# Patient Record
Sex: MALE | Race: BLACK OR AFRICAN AMERICAN | NOT HISPANIC OR LATINO | URBAN - METROPOLITAN AREA
[De-identification: names, ages, dates, MRNs, and addresses within clinical notes are randomized per-mention and may not be internally consistent; named-entity substitution may affect disease eponyms.]

---

## 2020-03-27 ENCOUNTER — EMERGENCY (EMERGENCY)
Facility: HOSPITAL | Age: 50
LOS: 0 days | Discharge: HOME | End: 2020-03-27
Payer: COMMERCIAL

## 2020-03-27 VITALS
SYSTOLIC BLOOD PRESSURE: 119 MMHG | RESPIRATION RATE: 18 BRPM | DIASTOLIC BLOOD PRESSURE: 92 MMHG | HEIGHT: 67 IN | TEMPERATURE: 99 F | OXYGEN SATURATION: 99 % | HEART RATE: 99 BPM | WEIGHT: 154.98 LBS

## 2020-03-27 DIAGNOSIS — R50.9 FEVER, UNSPECIFIED: ICD-10-CM

## 2020-03-27 DIAGNOSIS — B34.9 VIRAL INFECTION, UNSPECIFIED: ICD-10-CM

## 2020-03-27 PROCEDURE — 99283 EMERGENCY DEPT VISIT LOW MDM: CPT

## 2020-03-27 RX ORDER — ACETAMINOPHEN WITH CODEINE 300MG-30MG
15 TABLET ORAL
Qty: 300 | Refills: 0
Start: 2020-03-27 | End: 2020-03-31

## 2020-03-27 NOTE — ED PROVIDER NOTE - PHYSICAL EXAMINATION
PE:   GEN: Awake, alert, interactive, NAD, non-toxic appearing.   NOSE: Patent, no epistaxis, no congestion  MOUTH/THROAT: Patent, uvula midline, no tonsillar edema or erythema, no acute dental findings, no oral lesions or ulcerations, no Hoarse voice, MMM  CARDIAC: FAST rate and rhythm, S1,S2, no murmur/rub/gallop. Strong central and peripheral pulses, Brisk cap refill, no evident pedal edema.   RESP: No distress noted. + cough L/S clear = Bilat without accessory muscle use, wheeze, rhonchi, rales.   NEURO: AOx3, CN II-XII grossly intact without focal deficit.   SKIN: Warm, dry, normal color, without apparent rashes.

## 2020-03-27 NOTE — ED PROVIDER NOTE - CLINICAL SUMMARY MEDICAL DECISION MAKING FREE TEXT BOX
48 yo male with cough and fever, neg flu swab at urgent care concerned for cough and difficulty sleeping will dc with cough medication and insturctions for covid-19 testing drive through.

## 2020-03-27 NOTE — ED PROVIDER NOTE - NS ED ROS FT
Constitutional: + Fever, - Chills, - Anorexia, - Fatigue  NOSE: - Congestion, - Bloody nose, - alteration in smell  MOUTH/THROAT: - Vocal Changes, - Drooling, - Sore throat,  -alteration in taste  CV: - Palpitations, - Chest Pain, - Edema   RESP:  + Cough, - Shortness of Breath, - Dyspnea on Exertion, - Trouble speaking, - Pain with breathing, - Wheezing  GI: + Diarrhea, - Constipation, - Nausea, - Vomiting, - Abdominal Pain  : - Dysuria, -Frequency, - Hematuria, - dark urine  SKIN: - Color change, - Rash, - Swelling, - Bruises, - Wounds  NEURO: - Change in behavior, - Dec. Alertness, - Headache, - Dizziness

## 2020-03-27 NOTE — ED PROVIDER NOTE - NS_EDPROVIDERDISPOUSERTYPE_ED_A_ED
spontaneous
belly breathing/spontaneous/labored
I have personally evaluated and examined the patient. The Attending was available to me as a supervising provider if needed.

## 2020-03-27 NOTE — ED ADULT NURSE NOTE - NSIMPLEMENTINTERV_GEN_ALL_ED
Implemented All Universal Safety Interventions:  [place] to call system. Call bell, personal items and telephone within reach. Instruct patient to call for assistance. Room bathroom lighting operational. Non-slip footwear when patient is off stretcher. Physically safe environment: no spills, clutter or unnecessary equipment. Stretcher in lowest position, wheels locked, appropriate side rails in place.

## 2020-03-27 NOTE — ED PROVIDER NOTE - PATIENT PORTAL LINK FT
You can access the FollowMyHealth Patient Portal offered by [facility] by registering at the following website: [email or web address]. By joining [employer]’s FollowMyHealth portal, you will also be able to view your health information using other applications (apps) compatible with our system.

## 2020-03-27 NOTE — ED PROVIDER NOTE - NSFOLLOWUPINSTRUCTIONS_ED_ALL_ED_FT
rest, hydrate well with water, Gatorade, protein shakes, smoothies, etc.    For fevers , chills, and/or aches/pains, you can take Tylenol 650mg-975mg (no more than 15 mg / kg) every 4-6 hours as needed.  Do not take more than 1000mg of Tylenol at a time, and do not take more than 4000mg of Tylenol daily.     cough medication as prescribed    return for worsening shortness of breath/difficulty breathing, severe weakness, or other concerns.     COVID-19    COVID-19, also known as coronavirus disease or novel coronavirus, is caused by a type of virus that causes respiratory illness. This may lead to inflammation and the buildup of mucus and fluids in the airway of the lungs (pneumonia). There are many different coronaviruses. Most of these viruses only affect animals, but sometimes these viruses can change and infect people.    What are the causes?  This illness is caused by a virus. You may catch the virus by:  Breathing in droplets from an infected person's cough or sneeze.Touching something, like a table or a doorknob, that was exposed to the virus (contaminated) and then touching your mouth, nose, or eyes.Being around animals that carry the virus, or eating uncooked or undercooked meat or animal products that contain the virus.    What increases the risk?  You are more likely to develop this condition if you:  Live in or travel to an area with a COVID-19 outbreak.Come in contact with a sick person who recently traveled to an area with a COVID-19 outbreak.Provide care for or live with a person who is infected with   COVID-19.What are the signs or symptoms?    COVID-19 causes respiratory illness that can lead to pneumonia. Symptoms of pneumonia may include:  A fever.A cough.Difficulty breathing.How is this diagnosed?  This condition may be diagnosed based on:    Your signs and symptoms, especially if:  You live in an area with a COVID-19 outbreak.You recently traveled to or from an area where the virus is common.You provide care for or live with a person who was diagnosed with COVID-19.A physical exam.    Lab tests, which may include:    A nasal swab to take a sample of fluid from your nose.A throat swab to take a sample of fluid from your throat.A sample of mucus from your lungs (sputum).Blood tests.How is this treated?  There is no medicine to treat COVID-19. Your health care provider will talk with you about ways to treat your symptoms. This may include rest, fluids, and over-the-counter medicines.  Follow these instructions at home:  Lifestyle     Use a cool-mist humidifier to add moisture to the air. This can help you breathe more easily.Do not use any products that contain nicotine or tobacco, such as cigarettes, e-cigarettes, and chewing tobacco. If you need help quitting, ask your health care provider.Rest at home as told by your health care provider.Return to your normal activities as told by your health care provider. Ask your health care provider what activities are safe for you.General instructions     Take over-the-counter and prescription medicines only as told by your health care provider.Drink enough fluid to keep your urine pale yellow.Keep all follow-up visits as told by your health care provider. This is important.How is this prevented?     There is no vaccine to help prevent COVID-19 infection. However, there are steps you can take to protect yourself and others from this virus.  To protect yourself:     Do not travel to areas where COVID-19 is a risk. The areas where COVID-19 is reported change often. To identify high-risk areas, check the CDC travel website: wwwnc.cdc.gov/travel/noticesIf you live in, or must travel to, an area where COVID-19 is a risk, take precautions to avoid infection.  Stay away from people who are sick.Stay away from places where there are animals that may carry the virus. This includes places where animals and animal products are sold. Note that both living and dead animals can carry the virus.Wash your hands often with soap and water. If soap and water are not available, use an alcohol-based hand .Avoid touching your mouth, face, eyes, or nose.To protect others:     If you have symptoms, take steps to prevent the virus from spreading to others.  If you think you have a COVID-19 infection, contact your health care provider right away. Tell your health care team that you think you may have a COVID-19 infection.Stay home. Leave your house only to seek medical care.Do not travel while you are sick.Wash your hands often with soap and water. If soap and water are not available, use alcohol-based hand .Stay away from other members of your household. If possible, stay in your own room, separate from others. Use a different bathroom.Make sure that all people in your household wash their hands well and often.Cough or sneeze into a tissue or your sleeve or elbow. Do not cough or sneeze into your hand or into the air.Wear a face mask.Where to find more information  Centers for Disease Control and Prevention: www.cdc.gov/coronavirus/2019-ncov/index.htmlW[facility] Organization: www.who.int/health-topics/coronavirusContact a health care provider if:  You have traveled to an area where COVID-19 is a risk and you have symptoms of the infection.You have contact with someone who has traveled to an area where COVID-19 is a risk and you have symptoms of the infection.Get help right away if:  You have trouble breathing.You have chest pain.    Summary  COVID-19 is caused by a type of virus that causes respiratory illness. This may lead to inflammation and the buildup of mucus and fluids in the airway of the lungs (pneumonia).You are more likely to develop this condition if you live in or travel to an area with a COVID-19 outbreak.There is no medicine to treat COVID-19. Your health care provider will talk with you about ways to treat your symptoms.Take steps to protect yourself and others from infection. Wash your hands often. Stay away from other people who are sick and wear a mask if you are sick.This information is not intended to replace advice given to you by your health care provider. Make sure you discuss any questions you have with your health care provider.

## 2020-03-27 NOTE — ED PROVIDER NOTE - OBJECTIVE STATEMENT
this is a 48 yo male, with 2 days of symptoms that include fever to 100.8 and cough that has been keeping him up at night.  has been taking tylenol for fever with relief. went to [facility] yesterday and reports neg flu test. no other complaints at this time. concerned for covid-19 as he is a .

## 2022-08-03 NOTE — ED ADULT NURSE NOTE - DRUG PRE-SCREENING (DAST -1)
[5___] : quadriceps 5[unfilled]/5 [AP] : anteroposterior [Lateral] : lateral [[place]] : skyline [Advanced patellofemoral OA] : Advanced patellofemoral OA [Left] : left foot and ankle [] : no lateral malleolus tenderness [FreeTextEntry3] : healed incision, prominent hardware laterally that is not TTP Statement Selected

## 2022-11-24 ENCOUNTER — EMERGENCY (EMERGENCY)
Facility: HOSPITAL | Age: 52
LOS: 1 days | Discharge: HOME | End: 2022-11-24
Attending: EMERGENCY MEDICINE | Admitting: EMERGENCY MEDICINE

## 2022-11-24 VITALS
RESPIRATION RATE: 16 BRPM | SYSTOLIC BLOOD PRESSURE: 147 MMHG | DIASTOLIC BLOOD PRESSURE: 99 MMHG | OXYGEN SATURATION: 100 % | HEART RATE: 84 BPM | TEMPERATURE: 98 F

## 2022-11-24 VITALS
RESPIRATION RATE: 16 BRPM | HEART RATE: 79 BPM | SYSTOLIC BLOOD PRESSURE: 142 MMHG | TEMPERATURE: 98 F | OXYGEN SATURATION: 100 % | DIASTOLIC BLOOD PRESSURE: 94 MMHG

## 2022-11-24 PROBLEM — Z78.9 OTHER SPECIFIED HEALTH STATUS: Chronic | Status: ACTIVE | Noted: 2020-03-27

## 2022-11-24 PROCEDURE — 99284 EMERGENCY DEPT VISIT MOD MDM: CPT

## 2022-11-24 PROCEDURE — 73030 X-RAY EXAM OF SHOULDER: CPT | Mod: 26,LT

## 2022-11-24 RX ORDER — ACETAMINOPHEN 500 MG
975 TABLET ORAL ONCE
Refills: 0 | Status: COMPLETED | OUTPATIENT
Start: 2022-11-24 | End: 2022-11-24

## 2022-11-24 RX ORDER — KETOROLAC TROMETHAMINE 30 MG/ML
15 SYRINGE (ML) INJECTION ONCE
Refills: 0 | Status: DISCONTINUED | OUTPATIENT
Start: 2022-11-24 | End: 2022-11-24

## 2022-11-24 RX ORDER — LIDOCAINE 4 G/100G
1 CREAM TOPICAL ONCE
Refills: 0 | Status: COMPLETED | OUTPATIENT
Start: 2022-11-24 | End: 2022-11-24

## 2022-11-24 RX ADMIN — Medication 15 MILLIGRAM(S): at 05:17

## 2022-11-24 RX ADMIN — Medication 975 MILLIGRAM(S): at 05:16

## 2022-11-24 RX ADMIN — LIDOCAINE 1 PATCH: 4 CREAM TOPICAL at 05:19

## 2022-11-24 NOTE — ED PROVIDER NOTE - NSFOLLOWUPINSTRUCTIONS_ED_ALL_ED_FT
You were seen in the emergency department for shoulder pain. We have evaluated you and determined that you do not require further hospital interventions.    Please follow up with your primary care provider as necessary to discuss the results of your stay in our department.    You may continue to experience pain while at home. For pain, you can take 1000 mg of Tylenol (acetaminophen) every 6 hours. Do not take more than four doses (4000 mg) of Tylenol in a 24h period. You can also take 400 mg of Motrin/Advil (ibuprofen) every 6 hours. Do not take more than four doses (1600 mg) of Motrin/Advil in a 24h period. In addition to medication, you may try over-the-counter lidocaine patches and heat as needed.    If you start to experience worsening symptoms such as numbness in your fingers, redness, or swelling, please return to the emergency department for further evaluation.

## 2022-11-24 NOTE — ED PROVIDER NOTE - PATIENT PORTAL LINK FT
You can access the FollowMyHealth Patient Portal offered by [facility] by registering at the following website: [email or web address]. By joining [employer]’s FollowMyHealth portal, you will also be able to view your health information using other applications (apps) compatible with our system.

## 2022-11-24 NOTE — ED PROVIDER NOTE - NSFOLLOWUPCLINICS_GEN_ALL_ED_FT
[facility]  Orthopedic Surgery  [address], 3rd & [address]th [place] [address]  Phone: [phone]  Fax:     [facility]  Sports Medicine  [address] [place] [address]  Phone: [phone]  Fax:

## 2022-11-24 NOTE — ED PROVIDER NOTE - CLINICAL SUMMARY MEDICAL DECISION MAKING FREE TEXT BOX
52M here for 2wks atraumatic shoulder pain. Exam consistent with MSK pain suggestive of rotator cuff pathology. He has no risk factors or history which suggests this is related to a cardiac cause. Will obtain XR of shoulder and send referral for ortho/sports med. Will give toradol, acetaminophen, lido patch, reassess.

## 2022-11-24 NOTE — ED PROVIDER NOTE - PHYSICAL EXAMINATION
General: well appearing, alert, oriented to person, time, place  Psych: mood appropriate  Head: no nuchal rigidity; normocephalic; atraumatic  Eyes: conjunctivae clear bilaterally, sclerae anicteric  ENT: no nasal flaring, patent nares  Cardio: RRR, no m/r/g, pulses 2+ b/l  Resp: CATB, no w/r/r  GI: soft/nondistended/nontender  : no CVA tenderness  Neuro: normal sensation, moving all four extremities equally  Skin: No evidence of rash or bruising  MSK: +Traore, +Job, no TTP, FROM in the L shoulder in abduction, external/internal rotation, flexion, and extension  Lymph/Vasc: no LE edema

## 2022-11-24 NOTE — ED ADULT TRIAGE NOTE - WAS YOUR LAST COVID-19 VACCINE GREATER THAN OR EQUAL TO TWO MONTHS AGO?
Susi from [facility] calling to go over the order for furosemide for patient . Please call her at, [phone]. Needs to verbally go over it.   Yes

## 2022-11-24 NOTE — ED ADULT NURSE NOTE - OBJECTIVE STATEMENT
Pt arrives to ED rm 11 A&Ox4 and ambulatory c/o L shoulder pain x2 weeks, that has remained consistent but became worse tonight. Pt denies any heavy lifting, or trauma to site. When pt lifts arm the pain worsens. Respirations are even and unlabored, pt denies chest pain, SOB, abd pain, nausea or vomiting. Medicated as per MD order

## 2022-11-24 NOTE — ED PROVIDER NOTE - OBJECTIVE STATEMENT
52M no PMH here for 2wks of atraumatic shoulder pain. Pain is nonexertional and radiates to the lateral neck, chest, and back. He denies any fevers, chills, or shortness of breath. 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 He has not tried anything for the pain apart from a topical spray.

## 2022-11-24 NOTE — ED ADULT TRIAGE NOTE - CHIEF COMPLAINT QUOTE
Pt. c/o left shoulder pain x 2 weeks. pain worse today radiating to back and chest. Denies trauma, heavy lifting, fever, chills, sob. No PMHx.

## 2022-11-24 NOTE — ED PROVIDER NOTE - ATTENDING CONTRIBUTION TO CARE
52-year-old male with no significant past medical history here with 2 weeks of left shoulder pain.  Patient reports pain to left shoulder, worse at night when he is lying on it.  Denies any other injuries or trauma.  No associated fever, swelling, rash, numbness, tingling, weakness.  Well appearing, lying comfortably in stretcher, awake and alert, nontoxic.  VSS.  LUE intact, no swelling, no deformity, FROM at L shoulder/elbow/wrist, LUE is NVI.  Suspect musculoskeletal pain, low suspicion for fracture or dislocation.  Will eval with x-ray.  Continue pain with controlled with NSAIDs, outpatient Ortho follow-up.

## 2024-09-26 ENCOUNTER — APPOINTMENT (OUTPATIENT)
Dept: ORTHOPEDIC SURGERY | Facility: CLINIC | Age: 54
End: 2024-09-26
Payer: COMMERCIAL

## 2024-09-26 VITALS — BODY MASS INDEX: 25.11 KG/M2 | HEIGHT: 67 IN | WEIGHT: 160 LBS

## 2024-09-26 DIAGNOSIS — M75.50 BURSITIS OF UNSPECIFIED SHOULDER: ICD-10-CM

## 2024-09-26 DIAGNOSIS — I10 ESSENTIAL (PRIMARY) HYPERTENSION: ICD-10-CM

## 2024-09-26 DIAGNOSIS — S46.912A STRAIN OF UNSPECIFIED MUSCLE, FASCIA AND TENDON AT SHOULDER AND UPPER ARM LEVEL, LEFT ARM, INITIAL ENCOUNTER: ICD-10-CM

## 2024-09-26 DIAGNOSIS — S46.911A STRAIN OF UNSPECIFIED MUSCLE, FASCIA AND TENDON AT SHOULDER AND UPPER ARM LEVEL, RIGHT ARM, INITIAL ENCOUNTER: ICD-10-CM

## 2024-09-26 DIAGNOSIS — M75.51 BURSITIS OF RIGHT SHOULDER: ICD-10-CM

## 2024-09-26 DIAGNOSIS — M75.52 BURSITIS OF RIGHT SHOULDER: ICD-10-CM

## 2024-09-26 DIAGNOSIS — S43.439A SUPERIOR GLENOID LABRUM LESION OF UNSPECIFIED SHOULDER, INITIAL ENCOUNTER: ICD-10-CM

## 2024-09-26 PROBLEM — Z00.00 ENCOUNTER FOR PREVENTIVE HEALTH EXAMINATION: Status: ACTIVE | Noted: 2024-09-26

## 2024-09-26 PROCEDURE — 73030 X-RAY EXAM OF SHOULDER: CPT | Mod: 50

## 2024-09-26 PROCEDURE — 99204 OFFICE O/P NEW MOD 45 MIN: CPT

## 2024-09-26 RX ORDER — MELOXICAM 15 MG/1
15 TABLET ORAL
Qty: 30 | Refills: 2 | Status: ACTIVE | COMMUNITY
Start: 2024-09-26 | End: 1900-01-01

## 2024-09-26 RX ORDER — LOSARTAN POTASSIUM 100 MG/1
TABLET, FILM COATED ORAL
Refills: 0 | Status: ACTIVE | COMMUNITY

## 2024-09-26 NOTE — PHYSICAL EXAM
[Left] : left shoulder [Right] : right shoulder [] : no erythema [5 ___] : forward flexion 5[unfilled]/5 [5___] : internal rotation 5[unfilled]/5 [TWNoteComboBox7] : active forward flexion 155 degrees [de-identified] : active abduction 140 degrees [TWNoteComboBox6] : internal rotation L4 [de-identified] : external rotation 70 degrees

## 2024-09-26 NOTE — HISTORY OF PRESENT ILLNESS
[de-identified] : 9/26/24:   B/L Shoulder discomfort. L>R. LEFT hurts mainly when the patient coughs, QHS but not as bad. Right shoulder has been ongoing for years. NKI to either. No Sx

## 2024-09-26 NOTE — DISCUSSION/SUMMARY
[de-identified] : HEP  and PT.  Mobic. The patient's orthopaedic condition(s) warrants intermittent use of a prescription strength non-steroidal anti-inflammatory medication.  These medications are associated with risks including but not limited to gastrointestinal irritation, kidney damage, hypertension, and bleeding.  The patient understands and will take medications as prescribed.  The patient will stop the medication and consult a physician as needed if problems arise.  MRI left shoulder to rule out labral tear.  IF patient feels any chest pain, SOB, dizziness other problems should go to ER but on this visit totally stable.  Follow up after mri.

## 2024-09-26 NOTE — ASSESSMENT
[FreeTextEntry1] : bilateral shoulder pain for several months.  left shoulder worse than right.  no new injury or trauma.    left shoulder.  anterior and deep shoulder pain.  possible labral tear shoulder.  possible cuff inflammation.  patient also notes increased left shoulder pain with coughing. do not understand this completely. patient states he has seen pcp and they did work up for chest including xrays, ekg and other testing.  no acute distress at any  point and heart rate and pulse feel normal.  right shoulder.  rom and strength good.  doing okay today.   high cholesterol, htn

## 2024-09-30 ENCOUNTER — APPOINTMENT (OUTPATIENT)
Dept: MRI IMAGING | Facility: CLINIC | Age: 54
End: 2024-09-30
Payer: COMMERCIAL

## 2024-09-30 PROCEDURE — 73221 MRI JOINT UPR EXTREM W/O DYE: CPT | Mod: LT

## 2024-10-08 ENCOUNTER — APPOINTMENT (OUTPATIENT)
Dept: ORTHOPEDIC SURGERY | Facility: CLINIC | Age: 54
End: 2024-10-08
Payer: COMMERCIAL

## 2024-10-08 VITALS — BODY MASS INDEX: 25.11 KG/M2 | HEIGHT: 67 IN | WEIGHT: 160 LBS

## 2024-10-08 DIAGNOSIS — Z78.9 OTHER SPECIFIED HEALTH STATUS: ICD-10-CM

## 2024-10-08 DIAGNOSIS — Z00.00 ENCOUNTER FOR GENERAL ADULT MEDICAL EXAMINATION W/OUT ABNORMAL FINDINGS: ICD-10-CM

## 2024-10-08 DIAGNOSIS — M75.50 BURSITIS OF UNSPECIFIED SHOULDER: ICD-10-CM

## 2024-10-08 PROCEDURE — 20611 DRAIN/INJ JOINT/BURSA W/US: CPT | Mod: LT

## 2024-10-08 PROCEDURE — 99213 OFFICE O/P EST LOW 20 MIN: CPT | Mod: 25

## 2024-10-08 PROCEDURE — 99214 OFFICE O/P EST MOD 30 MIN: CPT | Mod: 25

## 2024-10-08 PROCEDURE — J3490M: CUSTOM

## 2024-11-19 ENCOUNTER — APPOINTMENT (OUTPATIENT)
Dept: ORTHOPEDIC SURGERY | Facility: CLINIC | Age: 54
End: 2024-11-19

## 2024-12-31 NOTE — ED ADULT NURSE NOTE - NS PRO PASSIVE SMOKE EXP
Maryellen ATTG    39-year-old male chief complaint abdominal pain.  Patient been having abdominal pain/nausea vomiting for the past 3 days but has been having on and off vomiting since October.  Patient has a history of gastric sleeve 2021.  Patient thought he had dumping syndrome symptoms  and thought it will resolve has no nausea medicine at home.  Patient coming in for consistent vomiting unable to keep hydrated.    GENERAL: Awake, alert, NAD  LUNGS:  non labored breathing   ABDOMEN:  tenderness mid abdomen mildly distended   EXT:, no deformities.  NEURO: A&Ox3. Moving all extremities.  SKIN: Warm and dry. No rash.  PSYCH: Normal affect.     given hx and pe lissa cont n/v will need ct to asess for sbo given surgical hx, labs imaging, possible e-  disturbance given amount of n/v pt appears dehydrated No